# Patient Record
Sex: FEMALE | Employment: PART TIME | URBAN - METROPOLITAN AREA
[De-identification: names, ages, dates, MRNs, and addresses within clinical notes are randomized per-mention and may not be internally consistent; named-entity substitution may affect disease eponyms.]

---

## 2017-02-15 ENCOUNTER — OFFICE VISIT (OUTPATIENT)
Dept: PSYCHIATRY | Facility: CLINIC | Age: 52
End: 2017-02-15
Payer: COMMERCIAL

## 2017-02-15 VITALS
HEART RATE: 73 BPM | DIASTOLIC BLOOD PRESSURE: 72 MMHG | SYSTOLIC BLOOD PRESSURE: 121 MMHG | BODY MASS INDEX: 30.05 KG/M2 | WEIGHT: 176 LBS | HEIGHT: 64 IN

## 2017-02-15 DIAGNOSIS — F41.9 ANXIETY DISORDER, UNSPECIFIED TYPE: ICD-10-CM

## 2017-02-15 DIAGNOSIS — F33.0 MDD (MAJOR DEPRESSIVE DISORDER), RECURRENT EPISODE, MILD: Primary | ICD-10-CM

## 2017-02-15 DIAGNOSIS — F98.8 ATTENTION DEFICIT DISORDER: ICD-10-CM

## 2017-02-15 DIAGNOSIS — F90.2 ATTENTION DEFICIT HYPERACTIVITY DISORDER (ADHD), COMBINED TYPE, MILD: ICD-10-CM

## 2017-02-15 DIAGNOSIS — F32.A DEPRESSION, UNSPECIFIED DEPRESSION TYPE: ICD-10-CM

## 2017-02-15 PROCEDURE — 99999 PR PBB SHADOW E&M-EST. PATIENT-LVL III: CPT | Mod: PBBFAC,,, | Performed by: PSYCHIATRY & NEUROLOGY

## 2017-02-15 PROCEDURE — 99214 OFFICE O/P EST MOD 30 MIN: CPT | Mod: S$GLB,,, | Performed by: PSYCHIATRY & NEUROLOGY

## 2017-02-15 RX ORDER — DEXTROAMPHETAMINE SACCHARATE, AMPHETAMINE ASPARTATE MONOHYDRATE, DEXTROAMPHETAMINE SULFATE AND AMPHETAMINE SULFATE 7.5; 7.5; 7.5; 7.5 MG/1; MG/1; MG/1; MG/1
60 CAPSULE, EXTENDED RELEASE ORAL EVERY MORNING
Qty: 60 CAPSULE | Refills: 0 | Status: SHIPPED | OUTPATIENT
Start: 2017-02-20 | End: 2017-05-15

## 2017-02-15 RX ORDER — DEXTROAMPHETAMINE SACCHARATE, AMPHETAMINE ASPARTATE MONOHYDRATE, DEXTROAMPHETAMINE SULFATE AND AMPHETAMINE SULFATE 7.5; 7.5; 7.5; 7.5 MG/1; MG/1; MG/1; MG/1
60 CAPSULE, EXTENDED RELEASE ORAL EVERY MORNING
Qty: 60 CAPSULE | Refills: 0 | Status: SHIPPED | OUTPATIENT
Start: 2017-04-20 | End: 2017-05-15 | Stop reason: SDUPTHER

## 2017-02-15 RX ORDER — DEXTROAMPHETAMINE SACCHARATE, AMPHETAMINE ASPARTATE MONOHYDRATE, DEXTROAMPHETAMINE SULFATE AND AMPHETAMINE SULFATE 7.5; 7.5; 7.5; 7.5 MG/1; MG/1; MG/1; MG/1
60 CAPSULE, EXTENDED RELEASE ORAL EVERY MORNING
Qty: 60 CAPSULE | Refills: 0 | Status: SHIPPED | OUTPATIENT
Start: 2017-03-21 | End: 2017-05-15

## 2017-02-15 NOTE — PROGRESS NOTES
Outpatient Psychiatry Follow-Up Visit (MD/NP)   2/15/2017    Clinical Status of Patient: Outpatient (Ambulatory)     Session Length:  30 minutes (E&M)      Chief Complaint: Leah Sung is a 51 y.o. female who presents today for follow-up of depression, anxiety, inattention and interpersonal relational problems.  Met with patient.     Interval History and Content of Current Session:   General impression: First appointment since 11/23/2016. She notes interim events -- see below.     Target symptoms: Inattentional/concentrational difficulties, h/o dysthymic mood and irritibility, insomnia, crying spells, excessive worry, decreased energy and motivation, low self-esteem, feelings of helplessness; denies hopelessness or S.I. (only remote as a teen).     Interim events:  Discussed ongoing events -- self disclosure noted.    Generally doing OK overall.  Stays very busy.    She thinks her meds are helping her to manage her ADHD, depression and anxiety Sx well.      Pt states she has modified her diet; she is working on other lifestyle modifications.    She is enrolled in an on line Masters program -- the school is based in [place].  She states she is taking an equivalent of 12 hours currently (graduate level courses).  The Masters program is for [employer] with [employer] -- she states she will have a degree in this discipline.  She must watch a lot of on line videos every week.  She also must travel there and take a class on site at least once a year.  She states this program is accredited.      We also discussed about her daughter (18 yo), who is a senior in high school.  She is trying to choose a college.  She is strongly considering [employer], but has many other options.    Pt has limited most interactions with her ex- to just texting or occasional calling, always in direct correlation with their daughter.  She avoids all contact with his biological family, if possible.    Her daughter still sees her dad  and his family often.     She is still working at [employer] -- still dealing with her narcissistic boss who is a poor communicator.  She states her boss' supervisor is easier to work with overall, which takes some of the stress off of her.  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    She has a much smaller area to cover ([facility] and some areas on [place]).  She still does a fair amount of driving.      She continues to use the Adderall XR 30 mg in AM.  Without the medication, she is more hyperverbal, more spontaneous, speaks more rapidly, tends to change subjects more.  She states she is more calm when she takes 2, tends to be much more quiet and reserved.  She still gets distracted easily if she does not take both tabs daily.  She will tend to cut back some on days when she is not working.      PCP:  None currently.      Labs: Not done since 9/13/12: all results for TSH, CBC and CMP were unremarkable.     Review of Systems   · PSYCHIATRIC: Pertinant items are noted in the narrative.  · CONSTITUTIONAL: + recent wt gain     · MUSCULOSKELETAL: No pain or stiffness of the joints.  · NEUROLOGIC: No weakness, sensory changes, seizures, confusion, memory loss, tremor or other abnormal movements.  · ENDOCRINE: No polydipsia or polyuria.  · INTEGUMENTARY: No rashes or lacerations.  · EYES: No exophthalmos, jaundice or blindness.  · ENT: No dizziness, tinnitus or hearing loss.  · RESPIRATORY: No shortness of breath.  · CARDIOVASCULAR: No tachycardia or chest pain.  · GASTROINTESTINAL: No nausea, vomiting, pain, constipation or diarrhea.  · GENITOURINARY: No frequency, dysuria or sexual dysfunction.      Current Psychotropic Medications:   Adderall XR 30 mg   Two tablets oral in AM daily (GENERALLY TAKES  "BID DAILY)    Adderall 20 mg  1 tablet in PM prn in attention (TAKES RARELY)   Wellbutrin  mg   1 tablet oral daily (TAKES DAILY)     Compliance: Yes     Side effects: None     Risk Parameters:   Patient reports no suicidal ideation   Patient reports no homicidal ideation   Patient reports no self-injurious behavior   Patient reports no violent behavior     Patient's Response to Intervention:   The patient's response to intervention is motivated.     Progress Toward Goals and Other Mental Status Changes:   The patient's progress toward goals is fair.     Vitals: Most recent vital signs were noted:      Vitals - 1 value per visit 6/1/2015 4/26/2016 11/23/2016 2/15/2017   SYSTOLIC 172 146 149 121   DIASTOLIC 99 76 91 72   PULSE 83 89 79 73   Weight (lb) 171.2 167.4 173 176   HEIGHT 5' 4" 5' 4"  5' 4"   BODY MASS INDEX 29.37 28.73 29.7 30.21   VISIT REPORT           Mental Status Evaluation      Appearance:  unremarkable, age appropriate, casually dressed, neatly groomed, smiles on greeting    Behavior:  cooperative, eye contact normal, pleasant, engaging    Speech:  normal tone, normal pitch, normal volume, minimally pressured    Mood:  "OK"   Affect:  Euthymic to mildly anxious, congruent and appropriate    Thought Process:  Somewhat circumstantial, logical    Thought Content:  no suicidality, no homicidality, delusions, or paranoia    Sensorium:  grossly intact    Attention Span & Concentration  intact    Cognition:  fund of knowledge intact and appropriate to age and level of education    Insight:  good    Judgment:  good      Impression:   Attention-Deficit Hyperactivity Disorder, Combined Type     Major Depressive Disorder, Recurrent, Mild  Anxiety Disorder, unspecified    Plan:   Medication Management -- Continue current medications. Three Rx for Adderall XR, each for a 30 day supply with no refills & with "Do not fill until" dates posted accordingly, were given to patient.      Additional Notes -- " N/A    Return to Clinic: 3 months, or sooner prn.

## 2017-05-15 ENCOUNTER — OFFICE VISIT (OUTPATIENT)
Dept: PSYCHIATRY | Facility: CLINIC | Age: 52
End: 2017-05-15
Payer: COMMERCIAL

## 2017-05-15 VITALS
DIASTOLIC BLOOD PRESSURE: 79 MMHG | HEIGHT: 64 IN | HEART RATE: 96 BPM | SYSTOLIC BLOOD PRESSURE: 133 MMHG | WEIGHT: 179 LBS | BODY MASS INDEX: 30.56 KG/M2

## 2017-05-15 DIAGNOSIS — F41.9 ANXIETY DISORDER, UNSPECIFIED TYPE: ICD-10-CM

## 2017-05-15 DIAGNOSIS — F98.8 ATTENTION DEFICIT DISORDER: ICD-10-CM

## 2017-05-15 DIAGNOSIS — F33.0 MDD (MAJOR DEPRESSIVE DISORDER), RECURRENT EPISODE, MILD: ICD-10-CM

## 2017-05-15 DIAGNOSIS — F90.2 ATTENTION DEFICIT HYPERACTIVITY DISORDER (ADHD), COMBINED TYPE, MILD: Primary | ICD-10-CM

## 2017-05-15 PROCEDURE — 99999 PR PBB SHADOW E&M-EST. PATIENT-LVL III: CPT | Mod: PBBFAC,,, | Performed by: PSYCHIATRY & NEUROLOGY

## 2017-05-15 PROCEDURE — 99214 OFFICE O/P EST MOD 30 MIN: CPT | Mod: S$GLB,,, | Performed by: PSYCHIATRY & NEUROLOGY

## 2017-05-15 PROCEDURE — 1160F RVW MEDS BY RX/DR IN RCRD: CPT | Mod: S$GLB,,, | Performed by: PSYCHIATRY & NEUROLOGY

## 2017-05-15 RX ORDER — DEXTROAMPHETAMINE SACCHARATE, AMPHETAMINE ASPARTATE MONOHYDRATE, DEXTROAMPHETAMINE SULFATE AND AMPHETAMINE SULFATE 7.5; 7.5; 7.5; 7.5 MG/1; MG/1; MG/1; MG/1
60 CAPSULE, EXTENDED RELEASE ORAL EVERY MORNING
Qty: 60 CAPSULE | Refills: 0 | Status: SHIPPED | OUTPATIENT
Start: 2017-07-19 | End: 2017-08-14 | Stop reason: SDUPTHER

## 2017-05-15 RX ORDER — DEXTROAMPHETAMINE SACCHARATE, AMPHETAMINE ASPARTATE MONOHYDRATE, DEXTROAMPHETAMINE SULFATE AND AMPHETAMINE SULFATE 7.5; 7.5; 7.5; 7.5 MG/1; MG/1; MG/1; MG/1
60 CAPSULE, EXTENDED RELEASE ORAL EVERY MORNING
Qty: 60 CAPSULE | Refills: 0 | Status: SHIPPED | OUTPATIENT
Start: 2017-06-19 | End: 2017-08-14

## 2017-05-15 RX ORDER — DEXTROAMPHETAMINE SACCHARATE, AMPHETAMINE ASPARTATE MONOHYDRATE, DEXTROAMPHETAMINE SULFATE AND AMPHETAMINE SULFATE 7.5; 7.5; 7.5; 7.5 MG/1; MG/1; MG/1; MG/1
60 CAPSULE, EXTENDED RELEASE ORAL EVERY MORNING
Qty: 60 CAPSULE | Refills: 0 | Status: SHIPPED | OUTPATIENT
Start: 2017-05-20 | End: 2017-08-14

## 2017-05-15 RX ORDER — BUPROPION HYDROCHLORIDE 300 MG/1
300 TABLET ORAL DAILY
Qty: 30 TABLET | Refills: 12 | Status: SHIPPED | OUTPATIENT
Start: 2017-05-15 | End: 2018-02-07 | Stop reason: SDUPTHER

## 2017-05-15 NOTE — PROGRESS NOTES
Outpatient Psychiatry Follow-Up Visit (MD/NP)   5/15/2017    Clinical Status of Patient: Outpatient (Ambulatory)     Session Length:  30 minutes (E&M)      Chief Complaint: Leah Sung is a 52 y.o. female who presents today for follow-up of depression, anxiety, inattention and interpersonal relational problems.  Met with patient.     Interval History and Content of Current Session:   General impression: First appointment since 2/15/2017. She notes interim events -- see below.     Target symptoms: Inattentional/concentrational difficulties, h/o dysthymic mood and irritibility, insomnia, crying spells, excessive worry, decreased energy and motivation, low self-esteem, feelings of helplessness; denies hopelessness or S.I. (only remote as a teen).     Interim events:  Discussed ongoing events -- self disclosure noted.    She had a quiet Mother's Day -- she enjoyed this.      Daughter (Ayana) has been doing well.  She will be graduating on Saturday 5/20/17 from [employer].    She will be attending [employer] in [place] in the fall (pt is happy that she will be living with her and commuting to the campus).         Generally doing OK overall.  Stays very busy.  She is working on her Masters program -- the school is based in [place].  She states this program is accredited.  She states she is taking an equivalent of 12 hours currently (graduate level courses).  The Masters program is for [employer] with [employer] -- she states she will have a degree in this discipline.  She must watch a lot of on line videos every week.  She also must travel there and take a class on site at least once a year -- she is planning to do this later next week about a week after her daughter's graduation.  She states because she will be out of state, she will need an early fill on the Adderall XR (I told pt I would authorize early fill if needed).    She thinks her meds are helping her to manage her ADHD,  depression and anxiety Sx well.      Pt states she has modified her diet; she is working on other lifestyle modifications.      We also discussed about her daughter (18 yo), who is a senior in high school.  She is trying to choose a college.  She is strongly considering [employer], but has many other options.    Pt has limited most interactions with her ex- to just texting or occasional calling, always in direct correlation with their daughter.  She avoids all contact with his biological family, if possible.    Her daughter still sees her dad (pt's ex-) and his family often.     She is still working at [employer] -- still dealing with her narcissistic boss who is a poor communicator.  She states her boss' supervisor is easier to work with overall, which takes some of the stress off of her.  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    She has a much smaller area to cover ([facility] and some areas on [place]).  She still does a fair amount of driving.      She continues to use the Adderall XR 30 mg in AM.  Without the medication, she is more hyperverbal, more spontaneous, speaks more rapidly, tends to change subjects more.  She states she is more calm when she takes 2, tends to be much more quiet and reserved.  She still gets distracted easily if she does not take both tabs daily.  She will tend to cut back some on days when she is not working.      PCP:  None currently.      Labs: Not done since 9/13/12: all results for TSH, CBC and CMP were unremarkable.     Review of Systems   · PSYCHIATRIC: Pertinant items are noted in the narrative.  · CONSTITUTIONAL: + recent wt gain     · MUSCULOSKELETAL: No pain or stiffness of the joints.  · NEUROLOGIC: No weakness, sensory changes, seizures,  "confusion, memory loss, tremor or other abnormal movements.  · ENDOCRINE: No polydipsia or polyuria.  · INTEGUMENTARY: No rashes or lacerations.  · EYES: No exophthalmos, jaundice or blindness.  · ENT: No dizziness, tinnitus or hearing loss.  · RESPIRATORY: No shortness of breath.  · CARDIOVASCULAR: No tachycardia or chest pain.  · GASTROINTESTINAL: No nausea, vomiting, pain, constipation or diarrhea.  · GENITOURINARY: No frequency, dysuria or sexual dysfunction.      Current Psychotropic Medications:   Adderall XR 30 mg   Two tablets oral in AM daily (GENERALLY TAKES BID DAILY)    Adderall 20 mg  1 tablet in PM prn in attention (TAKES RARELY)   Wellbutrin  mg   1 tablet oral daily (TAKES DAILY)     Compliance: Yes     Side effects: None     Risk Parameters:   Patient reports no suicidal ideation   Patient reports no homicidal ideation   Patient reports no self-injurious behavior   Patient reports no violent behavior     Patient's Response to Intervention:   The patient's response to intervention is motivated.     Progress Toward Goals and Other Mental Status Changes:   The patient's progress toward goals is fair.     Vitals: Most recent vital signs were noted:      Vitals - 1 value per visit 4/26/2016 11/23/2016 2/15/2017 5/15/2017   SYSTOLIC 146 149 121 133   DIASTOLIC 76 91 72 79   PULSE 89 79 73 96   Weight (lb) 167.4 173 176 179   Weight (kg) 75.932 78.472 79.833 81.194   HEIGHT 5' 4"  5' 4" 5' 4"   BODY MASS INDEX 28.73 29.7 30.21 30.73   VISIT REPORT           Mental Status Evaluation      Appearance:  unremarkable, age appropriate, casually dressed, neatly groomed, smiles on greeting    Behavior:  cooperative, eye contact normal, pleasant, engaging    Speech:  normal tone, normal pitch, normal volume, minimally pressured    Mood:  "OK"   Affect:  Euthymic to mildly anxious, congruent and appropriate    Thought Process:  Somewhat circumstantial, logical    Thought Content:  no suicidality, no " "homicidality, delusions, or paranoia    Sensorium:  grossly intact    Attention Span & Concentration  intact    Cognition:  fund of knowledge intact and appropriate to age and level of education    Insight:  good    Judgment:  good      Impression:   Attention-Deficit Hyperactivity Disorder, Combined Type     Major Depressive Disorder, Recurrent, Mild  Anxiety Disorder, unspecified    Plan:   Medication Management -- Continue current medications. Three Rx for Adderall XR, each for a 30 day supply with no refills & with "Do not fill until" dates posted accordingly, were given to patient.      Additional Notes -- N/A    Return to Clinic: 3 months, or sooner prn.      "

## 2017-08-14 ENCOUNTER — OFFICE VISIT (OUTPATIENT)
Dept: PSYCHIATRY | Facility: CLINIC | Age: 52
End: 2017-08-14
Payer: COMMERCIAL

## 2017-08-14 VITALS
BODY MASS INDEX: 30.76 KG/M2 | SYSTOLIC BLOOD PRESSURE: 130 MMHG | HEIGHT: 64 IN | WEIGHT: 180.19 LBS | HEART RATE: 61 BPM | DIASTOLIC BLOOD PRESSURE: 83 MMHG

## 2017-08-14 DIAGNOSIS — F41.9 ANXIETY DISORDER, UNSPECIFIED TYPE: ICD-10-CM

## 2017-08-14 DIAGNOSIS — F90.0 ATTENTION DEFICIT HYPERACTIVITY DISORDER (ADHD), PREDOMINANTLY INATTENTIVE TYPE: Primary | ICD-10-CM

## 2017-08-14 DIAGNOSIS — F90.2 ATTENTION DEFICIT HYPERACTIVITY DISORDER (ADHD), COMBINED TYPE, MILD: ICD-10-CM

## 2017-08-14 DIAGNOSIS — F33.0 MDD (MAJOR DEPRESSIVE DISORDER), RECURRENT EPISODE, MILD: ICD-10-CM

## 2017-08-14 PROCEDURE — 99214 OFFICE O/P EST MOD 30 MIN: CPT | Mod: S$GLB,,, | Performed by: PSYCHIATRY & NEUROLOGY

## 2017-08-14 PROCEDURE — 99999 PR PBB SHADOW E&M-EST. PATIENT-LVL III: CPT | Mod: PBBFAC,,, | Performed by: PSYCHIATRY & NEUROLOGY

## 2017-08-14 PROCEDURE — 3008F BODY MASS INDEX DOCD: CPT | Mod: S$GLB,,, | Performed by: PSYCHIATRY & NEUROLOGY

## 2017-08-14 RX ORDER — DEXTROAMPHETAMINE SACCHARATE, AMPHETAMINE ASPARTATE MONOHYDRATE, DEXTROAMPHETAMINE SULFATE AND AMPHETAMINE SULFATE 7.5; 7.5; 7.5; 7.5 MG/1; MG/1; MG/1; MG/1
60 CAPSULE, EXTENDED RELEASE ORAL EVERY MORNING
Qty: 60 CAPSULE | Refills: 0 | Status: SHIPPED | OUTPATIENT
Start: 2017-09-17 | End: 2017-11-15

## 2017-08-14 RX ORDER — DEXTROAMPHETAMINE SACCHARATE, AMPHETAMINE ASPARTATE MONOHYDRATE, DEXTROAMPHETAMINE SULFATE AND AMPHETAMINE SULFATE 7.5; 7.5; 7.5; 7.5 MG/1; MG/1; MG/1; MG/1
60 CAPSULE, EXTENDED RELEASE ORAL EVERY MORNING
Qty: 60 CAPSULE | Refills: 0 | Status: SHIPPED | OUTPATIENT
Start: 2017-08-18 | End: 2017-11-15

## 2017-08-14 RX ORDER — DEXTROAMPHETAMINE SACCHARATE, AMPHETAMINE ASPARTATE MONOHYDRATE, DEXTROAMPHETAMINE SULFATE AND AMPHETAMINE SULFATE 7.5; 7.5; 7.5; 7.5 MG/1; MG/1; MG/1; MG/1
60 CAPSULE, EXTENDED RELEASE ORAL EVERY MORNING
Qty: 60 CAPSULE | Refills: 0 | Status: SHIPPED | OUTPATIENT
Start: 2017-10-17 | End: 2017-11-15 | Stop reason: SDUPTHER

## 2017-08-14 NOTE — PROGRESS NOTES
Outpatient Psychiatry Follow-Up Visit (MD/NP)   8/14/2017    Clinical Status of Patient: Outpatient (Ambulatory)     Session Length:  30 minutes (E&M)      Chief Complaint: Leah Sung is a 52 y.o. female who presents today for follow-up of depression, anxiety, inattention and interpersonal relational problems.  Met with patient.     Interval History and Content of Current Session:   General impression: First appointment since 5/15/2017. She notes interim events -- see below.     Target symptoms: Inattentional/concentrational difficulties, h/o dysthymic mood and irritibility, insomnia, crying spells, excessive worry, decreased energy and motivation, low self-esteem, feelings of helplessness; denies hopelessness or S.I. (only remote as a teen).     Interim events:  Discussed ongoing events -- self disclosure noted.      Her daughter graduated from high school and is now attending [employer] in [place]     She is still working towards getting her Master's -- 2 more years to go.  She travelled to [place] (her daughter went with her) for the class she needed to attend on the college campus. She is working on her Masters program -- the school is based in [place].  She states this program is accredited.  She states she is taking an equivalent of 12 hours currently (graduate level courses).  The Masters program is for [employer] with [employer] -- she states she will have a degree in this discipline.  She must watch a lot of on line videos every week.      She and her ex- also are planning to sell the house.  She wants to take her portion of the $ and put it into a new, smaller house.    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      She thinks her meds are helping her to manage her ADHD, depression and anxiety Sx well.    She continues to take the Adderall XR 30 mg in AM.  Without the medication, she is more hyperverbal, more spontaneous, speaks more rapidly,  tends to change subjects more.  She states she is more calm when she takes 2, tends to be much more quiet and reserved.  She still gets distracted easily if she does not take both tabs daily.  She will tend to cut back some on days when she is not working.      Pt states she has modified her diet; she is working on other lifestyle modifications.      She is still working at [employer] -- still dealing with her narcissistic boss who is a poor communicator.  She states her boss' supervisor is easier to work with overall, which takes some of the stress off of her.  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    She has a much smaller area to cover ([facility] and some areas on [place]).  She still does a fair amount of driving.      PCP:  None currently.      Labs: Not done since 9/13/12: all results for TSH, CBC and CMP were unremarkable.     Review of Systems   · PSYCHIATRIC: Pertinant items are noted in the narrative.  · CONSTITUTIONAL: + recent wt gain     · MUSCULOSKELETAL: No pain or stiffness of the joints.  · NEUROLOGIC: No weakness, sensory changes, seizures, confusion, memory loss, tremor or other abnormal movements.  · ENDOCRINE: No polydipsia or polyuria.  · INTEGUMENTARY: No rashes or lacerations.  · EYES: No exophthalmos, jaundice or blindness.  · ENT: No dizziness, tinnitus or hearing loss.  · RESPIRATORY: No shortness of breath.  · CARDIOVASCULAR: No tachycardia or chest pain.  · GASTROINTESTINAL: No nausea, vomiting, pain, constipation or diarrhea.  · GENITOURINARY: No frequency, dysuria or sexual dysfunction.      Current Psychotropic Medications:   Adderall XR 30 mg   Two tablets oral in AM daily (GENERALLY TAKES BID DAILY)    Adderall 20 mg  1 tablet in PM prn in attention (TAKES  "RARELY)   Wellbutrin  mg   1 tablet oral daily (TAKES DAILY)     Compliance: Yes     Side effects: None     Risk Parameters:   Patient reports no suicidal ideation   Patient reports no homicidal ideation   Patient reports no self-injurious behavior   Patient reports no violent behavior     Patient's Response to Intervention:   The patient's response to intervention is motivated.     Progress Toward Goals and Other Mental Status Changes:   The patient's progress toward goals is fair.     Vitals: Most recent vital signs were noted:      Vitals - 1 value per visit 11/23/2016 2/15/2017 5/15/2017 8/14/2017   SYSTOLIC 149 121 133 130   DIASTOLIC 91 72 79 83   PULSE 79 73 96 61   Weight (lb) 173 176 179 180.2   Weight (kg) 78.472 79.833 81.194 81.738   HEIGHT  5' 4" 5' 4" 5' 4"   BODY MASS INDEX 29.7 30.21 30.73 30.93   VISIT REPORT           Mental Status Evaluation      Appearance:  unremarkable, age appropriate, casually dressed, neatly groomed, smiles on greeting    Behavior:  cooperative, eye contact normal, pleasant, engaging    Speech:  normal tone, normal pitch, normal volume, minimally pressured    Mood:  "OK"   Affect:  Euthymic to mildly anxious, congruent and appropriate    Thought Process:  Mildly circumstantial, logical    Thought Content:  no suicidality, no homicidality, delusions, or paranoia    Sensorium:  grossly intact    Attention Span & Concentration  intact    Cognition:  fund of knowledge intact and appropriate to age and level of education    Insight:  good    Judgment:  good      Impression:   Attention-Deficit Hyperactivity Disorder, mainly inattentive type     Major Depressive Disorder, Recurrent, Mild  Anxiety Disorder, unspecified    Plan:   Medication Management -- Continue current medications. Three Rx for Adderall XR, each for a 30 day supply with no refills & with "Do not fill until" dates posted accordingly, were given to patient.      Additional Notes -- N/A    Return to Clinic: 3 " months, or sooner prn.

## 2017-11-15 ENCOUNTER — OFFICE VISIT (OUTPATIENT)
Dept: PSYCHIATRY | Facility: CLINIC | Age: 52
End: 2017-11-15
Payer: COMMERCIAL

## 2017-11-15 VITALS
DIASTOLIC BLOOD PRESSURE: 90 MMHG | HEART RATE: 85 BPM | SYSTOLIC BLOOD PRESSURE: 153 MMHG | HEIGHT: 64 IN | BODY MASS INDEX: 29.43 KG/M2 | WEIGHT: 172.38 LBS

## 2017-11-15 DIAGNOSIS — F90.2 ATTENTION DEFICIT HYPERACTIVITY DISORDER (ADHD), COMBINED TYPE, MILD: ICD-10-CM

## 2017-11-15 DIAGNOSIS — F41.9 ANXIETY DISORDER, UNSPECIFIED TYPE: ICD-10-CM

## 2017-11-15 DIAGNOSIS — F90.0 ATTENTION DEFICIT HYPERACTIVITY DISORDER (ADHD), PREDOMINANTLY INATTENTIVE TYPE: Primary | ICD-10-CM

## 2017-11-15 DIAGNOSIS — F33.0 MDD (MAJOR DEPRESSIVE DISORDER), RECURRENT EPISODE, MILD: ICD-10-CM

## 2017-11-15 PROCEDURE — 99214 OFFICE O/P EST MOD 30 MIN: CPT | Mod: S$GLB,,, | Performed by: PSYCHIATRY & NEUROLOGY

## 2017-11-15 PROCEDURE — 99999 PR PBB SHADOW E&M-EST. PATIENT-LVL III: CPT | Mod: PBBFAC,,, | Performed by: PSYCHIATRY & NEUROLOGY

## 2017-11-15 RX ORDER — DEXTROAMPHETAMINE SACCHARATE, AMPHETAMINE ASPARTATE MONOHYDRATE, DEXTROAMPHETAMINE SULFATE AND AMPHETAMINE SULFATE 7.5; 7.5; 7.5; 7.5 MG/1; MG/1; MG/1; MG/1
60 CAPSULE, EXTENDED RELEASE ORAL EVERY MORNING
Qty: 60 CAPSULE | Refills: 0 | Status: SHIPPED | OUTPATIENT
Start: 2018-01-13 | End: 2018-02-07 | Stop reason: SDUPTHER

## 2017-11-15 RX ORDER — DEXTROAMPHETAMINE SACCHARATE, AMPHETAMINE ASPARTATE MONOHYDRATE, DEXTROAMPHETAMINE SULFATE AND AMPHETAMINE SULFATE 7.5; 7.5; 7.5; 7.5 MG/1; MG/1; MG/1; MG/1
60 CAPSULE, EXTENDED RELEASE ORAL EVERY MORNING
Qty: 60 CAPSULE | Refills: 0 | Status: SHIPPED | OUTPATIENT
Start: 2017-12-14 | End: 2018-02-07 | Stop reason: SDUPTHER

## 2017-11-15 RX ORDER — DEXTROAMPHETAMINE SACCHARATE, AMPHETAMINE ASPARTATE MONOHYDRATE, DEXTROAMPHETAMINE SULFATE AND AMPHETAMINE SULFATE 7.5; 7.5; 7.5; 7.5 MG/1; MG/1; MG/1; MG/1
60 CAPSULE, EXTENDED RELEASE ORAL EVERY MORNING
Qty: 60 CAPSULE | Refills: 0 | Status: SHIPPED | OUTPATIENT
Start: 2017-11-15 | End: 2018-02-07 | Stop reason: SDUPTHER

## 2017-11-15 NOTE — PROGRESS NOTES
Outpatient Psychiatry Follow-Up Visit (MD/NP)   11/15/2017    Clinical Status of Patient: Outpatient (Ambulatory)     Session Length:  30 minutes (E&M)      Chief Complaint: Leah Sung is a 52 y.o. female who presents today for follow-up of depression, anxiety, inattention and interpersonal relational problems.  Met with patient.     Interval History and Content of Current Session:   General impression: First appointment since 8/14/2017. She notes interim events -- see below.     Target symptoms: Inattentional/concentrational difficulties, h/o dysthymic mood and irritibility, insomnia, crying spells, excessive worry, decreased energy and motivation, low self-esteem, feelings of helplessness; denies hopelessness or S.I. (only remote as a teen).     Interim events:  Discussed ongoing events -- self disclosure noted.      Her daughter graduated from high school in May 2017 and is now attending [email or web address] in [place]  Her daughter is living with her at home.  Pt notes she is trying to help her be more independent, be less reliant on her.     She is still working towards getting her Master's -- 2 more years to go.  The school is based in [place].  She states this program is accredited.  She states she is taking an equivalent of 12 hours currently (graduate level courses).  The Masters program is for [employer] with [employer] -- she states she will have a degree in this discipline.  She must watch a lot of on line videos every week.        She and her ex- are in the process of selling the house.  She wants to take her portion of the $ and put it into a new, smaller house.    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    Pt is also considering moving to [place], [place], after they sell the house and after she completes her courses and obtains certification.      She thinks her meds are helping her to manage her ADHD, depression and anxiety Sx well.    She  continues to take the Adderall XR 30 mg in AM.  Without the medication, she is more hyperverbal, more spontaneous, speaks more rapidly, tends to change subjects more.  She states she is more calm when she takes 2, tends to be much more quiet and reserved.  She still gets distracted easily if she does not take both tabs daily.  She will tend to cut back some on days when she is not working.      Pt states she has modified her diet; she is working on other lifestyle modifications.      She is still working at [employer] -- she has a new supervisor who is a better communicator overall.  She has had more responsibilities here -- self disclosure noted. 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    She has a much smaller area to cover ([facility] and some areas on [place]).  She still does a fair amount of driving.      PCP:  None currently.      Labs: Not done since 9/13/12: all results for TSH, CBC and CMP were unremarkable.     Review of Systems   · PSYCHIATRIC: Pertinant items are noted in the narrative.  · CONSTITUTIONAL: + recent wt loss     · MUSCULOSKELETAL: No pain or stiffness of the joints.  · NEUROLOGIC: No weakness, sensory changes, seizures, confusion, memory loss, tremor or other abnormal movements.  · ENDOCRINE: No polydipsia or polyuria.  · INTEGUMENTARY: No rashes or lacerations.  · EYES: No exophthalmos, jaundice or blindness.  · ENT: No dizziness, tinnitus or hearing loss.  · RESPIRATORY: No shortness of breath.  · CARDIOVASCULAR: No tachycardia or chest pain.  · GASTROINTESTINAL: No nausea, vomiting, pain, constipation or diarrhea.  · GENITOURINARY: No frequency, dysuria or sexual dysfunction.      Current Psychotropic Medications:   Adderall XR 30 mg   Two tablets oral in AM daily  "(GENERALLY TAKES BID DAILY)    Adderall 20 mg  1 tablet in PM prn in attention (TAKES RARELY)   Wellbutrin  mg   1 tablet oral daily (TAKES DAILY)     Compliance: Yes     Side effects: None     Risk Parameters:   Patient reports no suicidal ideation   Patient reports no homicidal ideation   Patient reports no self-injurious behavior   Patient reports no violent behavior     Patient's Response to Intervention:   The patient's response to intervention is motivated.     Progress Toward Goals and Other Mental Status Changes:   The patient's progress toward goals is fair.     Vitals: Most recent vital signs were noted:      Vitals - 1 value per visit 2/15/2017 5/15/2017 8/14/2017 11/15/2017   SYSTOLIC 121 133 130 153   DIASTOLIC 72 79 83 90   PULSE 73 96 61 85   Weight (lb) 176 179 180.2 172.4   Weight (kg) 79.833 81.194 81.738 78.2   HEIGHT 5' 4" 5' 4" 5' 4" 5' 4"   BODY MASS INDEX 30.21 30.73 30.93 29.59   VISIT REPORT           Mental Status Evaluation      Appearance:  unremarkable, age appropriate, casually dressed, neatly groomed, smiles on greeting    Behavior:  cooperative, eye contact normal, pleasant, engaging    Speech:  normal tone, normal pitch, normal volume, minimally pressured    Mood:  "OK"   Affect:  Euthymic, congruent and appropriate    Thought Process:  Mildly circumstantial, logical    Thought Content:  no suicidality, no homicidality, delusions, or paranoia    Sensorium:  grossly intact    Attention Span & Concentration  intact    Cognition:  fund of knowledge intact and appropriate to age and level of education    Insight:  good    Judgment:  good      Impression:   Attention-Deficit Hyperactivity Disorder, mainly inattentive type     Major Depressive Disorder, Recurrent, Mild  Anxiety Disorder, unspecified    Plan:   Medication Management -- Continue current medications. Three Rx for Adderall XR, each for a 30 day supply with no refills & with "Do not fill until" dates posted accordingly, " were given to patient.      Additional Notes -- N/A    Return to Clinic: 3 months, or sooner prn.

## 2018-02-07 ENCOUNTER — OFFICE VISIT (OUTPATIENT)
Dept: PSYCHIATRY | Facility: CLINIC | Age: 53
End: 2018-02-07
Payer: COMMERCIAL

## 2018-02-07 VITALS
DIASTOLIC BLOOD PRESSURE: 86 MMHG | HEART RATE: 96 BPM | BODY MASS INDEX: 29.09 KG/M2 | WEIGHT: 170.38 LBS | HEIGHT: 64 IN | SYSTOLIC BLOOD PRESSURE: 159 MMHG

## 2018-02-07 DIAGNOSIS — F90.2 ATTENTION DEFICIT HYPERACTIVITY DISORDER (ADHD), COMBINED TYPE, MILD: ICD-10-CM

## 2018-02-07 DIAGNOSIS — F41.9 ANXIETY DISORDER, UNSPECIFIED TYPE: ICD-10-CM

## 2018-02-07 DIAGNOSIS — F32.A DEPRESSION, UNSPECIFIED DEPRESSION TYPE: ICD-10-CM

## 2018-02-07 DIAGNOSIS — F90.0 ATTENTION DEFICIT HYPERACTIVITY DISORDER (ADHD), PREDOMINANTLY INATTENTIVE TYPE: Primary | ICD-10-CM

## 2018-02-07 DIAGNOSIS — F33.0 MDD (MAJOR DEPRESSIVE DISORDER), RECURRENT EPISODE, MILD: ICD-10-CM

## 2018-02-07 PROCEDURE — 3008F BODY MASS INDEX DOCD: CPT | Mod: S$GLB,,, | Performed by: PSYCHIATRY & NEUROLOGY

## 2018-02-07 PROCEDURE — 99214 OFFICE O/P EST MOD 30 MIN: CPT | Mod: S$GLB,,, | Performed by: PSYCHIATRY & NEUROLOGY

## 2018-02-07 PROCEDURE — 99999 PR PBB SHADOW E&M-EST. PATIENT-LVL III: CPT | Mod: PBBFAC,,, | Performed by: PSYCHIATRY & NEUROLOGY

## 2018-02-07 RX ORDER — DEXTROAMPHETAMINE SACCHARATE, AMPHETAMINE ASPARTATE MONOHYDRATE, DEXTROAMPHETAMINE SULFATE AND AMPHETAMINE SULFATE 7.5; 7.5; 7.5; 7.5 MG/1; MG/1; MG/1; MG/1
60 CAPSULE, EXTENDED RELEASE ORAL EVERY MORNING
Qty: 60 CAPSULE | Refills: 0 | Status: SHIPPED | OUTPATIENT
Start: 2018-03-14 | End: 2018-05-16

## 2018-02-07 RX ORDER — DEXTROAMPHETAMINE SACCHARATE, AMPHETAMINE ASPARTATE MONOHYDRATE, DEXTROAMPHETAMINE SULFATE AND AMPHETAMINE SULFATE 7.5; 7.5; 7.5; 7.5 MG/1; MG/1; MG/1; MG/1
60 CAPSULE, EXTENDED RELEASE ORAL EVERY MORNING
Qty: 60 CAPSULE | Refills: 0 | Status: SHIPPED | OUTPATIENT
Start: 2018-04-13 | End: 2018-05-16 | Stop reason: SDUPTHER

## 2018-02-07 RX ORDER — BUPROPION HYDROCHLORIDE 300 MG/1
300 TABLET ORAL DAILY
Qty: 30 TABLET | Refills: 12 | Status: SHIPPED | OUTPATIENT
Start: 2018-02-07

## 2018-02-07 RX ORDER — DEXTROAMPHETAMINE SACCHARATE, AMPHETAMINE ASPARTATE MONOHYDRATE, DEXTROAMPHETAMINE SULFATE AND AMPHETAMINE SULFATE 7.5; 7.5; 7.5; 7.5 MG/1; MG/1; MG/1; MG/1
60 CAPSULE, EXTENDED RELEASE ORAL EVERY MORNING
Qty: 60 CAPSULE | Refills: 0 | Status: SHIPPED | OUTPATIENT
Start: 2018-02-12 | End: 2018-05-16

## 2018-02-07 NOTE — PROGRESS NOTES
"Outpatient Psychiatry Follow-Up Visit (MD/NP)   2/7/2018    Clinical Status of Patient: Outpatient (Ambulatory)     Session Length:  30 minutes (E&M)      Chief Complaint: Leah Sung is a 52 y.o. female who presents today for follow-up of depression, anxiety, inattention and interpersonal relational problems.  Met with patient.     Interval History and Content of Current Session:   General impression: First appointment since 11/15/2017. She notes interim events -- see below.     Target symptoms: Inattentional/concentrational difficulties, h/o dysthymic mood and irritibility, insomnia, crying spells, excessive worry, decreased energy and motivation, low self-esteem, feelings of helplessness; denies hopelessness or S.I. (only remote as a teen).     Interim events:  Discussed ongoing events -- self disclosure noted.      "I'm a little stressed, but I'm hanging in there."  She feels she is coping with stress OK, both at work and at home.  "I've been wanda burning the candle at both ends".    She realizes when she does not take her medication, she does not sleep.      Her daughter is attending [employer] (freshman).  She is at home with pt, but commutes to school daily.  She has a boyfriend who is a music major.   Pt notes she is trying to help her be more independent, be less reliant on her.    She babysits to make extra money.    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      Pt is putting her house up on the market.    Pt has been going back and forth to [place].  She is working on her Masters.  She plans to take a board exam later this year.  She is strongly considering moving to [place] once her house sells.    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  She notes the challenges inherent in the system.      Pt is still working towards getting her Master's -- 2 more years to go.  The school is based in [place].  She states this program is accredited.  She states " she is taking an equivalent of 12 hours currently (graduate level courses).  The Masters program is for [employer] with [employer] -- she states she will have a degree in this discipline.  She must watch a lot of on line videos every week.         She thinks her meds are helping her to manage her ADHD, depression and anxiety Sx well.    She continues to take the Adderall XR 30 mg in AM.  Without the medication, she is more hyperverbal, more spontaneous, speaks more rapidly, tends to change subjects more.  She states she is more calm when she takes 2 & tends to be much more quiet and reserved.  She still gets distracted easily if she does not take both tabs daily.  She will tend to cut back some on days when she is not working.      Pt states she has modified her diet; she is working on other lifestyle modifications.    We discussed mindful meditation as well (pt has been trying a form of this on her own).      She is still working at [employer] -- she has a new supervisor who is a better communicator overall.  She has had more responsibilities here -- self disclosure noted. 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    She has a much smaller area to cover ([facility] and some areas on [place]).  She still does a fair amount of driving.      PCP:  None currently.      Labs: Not done since 9/13/12: all results for TSH, CBC and CMP were unremarkable.     Review of Systems   · PSYCHIATRIC: Pertinant items are noted in the narrative.  · CONSTITUTIONAL: + recent wt loss     · MUSCULOSKELETAL: No pain or stiffness of the joints.  · NEUROLOGIC: No weakness, sensory changes, seizures, confusion, memory loss, tremor or other abnormal movements.  · ENDOCRINE: No polydipsia or  "polyuria.  · INTEGUMENTARY: No rashes or lacerations.  · EYES: No exophthalmos, jaundice or blindness.  · ENT: No dizziness, tinnitus or hearing loss.  · RESPIRATORY: No shortness of breath.  · CARDIOVASCULAR: No tachycardia or chest pain.  · GASTROINTESTINAL: No nausea, vomiting, pain, constipation or diarrhea.  · GENITOURINARY: No frequency, dysuria or sexual dysfunction.      Current Psychotropic Medications:   Adderall XR 30 mg   Two tablets oral in AM daily (GENERALLY TAKES BID DAILY)    Adderall 20 mg  1 tablet in PM prn in attention (TAKES RARELY)   Wellbutrin  mg   1 tablet oral daily (TAKES DAILY)     Compliance: Yes     Side effects: None     Risk Parameters:   Patient reports no suicidal ideation   Patient reports no homicidal ideation   Patient reports no self-injurious behavior   Patient reports no violent behavior     Patient's Response to Intervention:   The patient's response to intervention is motivated.     Progress Toward Goals and Other Mental Status Changes:   The patient's progress toward goals is fair.     Vitals: Most recent vital signs were noted:      Vitals - 1 value per visit 5/15/2017 8/14/2017 11/15/2017 2/7/2018   SYSTOLIC 133 130 153 159   DIASTOLIC 79 83 90 86   PULSE 96 61 85 96   Weight (lb) 179 180.2 172.4 170.4   Weight (kg) 81.194 81.738 78.2 77.293   HEIGHT 5' 4" 5' 4" 5' 4" 5' 4"   BODY MASS INDEX 30.73 30.93 29.59 29.25   VISIT REPORT           Mental Status Evaluation      Appearance:  unremarkable, age appropriate, casually dressed, neatly groomed, smiles on greeting    Behavior:  cooperative, eye contact normal, pleasant, engaging    Speech:  normal tone, normal pitch, normal volume, minimally pressured    Mood:  "OK"   Affect:  Euthymic, congruent and appropriate    Thought Process:  Mildly circumstantial, logical    Thought Content:  no suicidality, no homicidality, delusions, or paranoia    Sensorium:  grossly intact    Attention Span & Concentration  intact  " "  Cognition:  fund of knowledge intact and appropriate to age and level of education    Insight:  good    Judgment:  good      Impression:   Attention-Deficit Hyperactivity Disorder, mainly inattentive type     Major Depressive Disorder, Recurrent, Mild  Anxiety Disorder, unspecified    Plan:   Medication Management -- Continue current medications as noted above. Three Rx for Adderall XR, each for a 30 day supply with no refills & with "Do not fill until" dates posted accordingly, were given to patient.      Additional Notes -- N/A    Return to Clinic: 3 months, or sooner prn.      "

## 2018-05-16 ENCOUNTER — OFFICE VISIT (OUTPATIENT)
Dept: PSYCHIATRY | Facility: CLINIC | Age: 53
End: 2018-05-16
Payer: COMMERCIAL

## 2018-05-16 VITALS
SYSTOLIC BLOOD PRESSURE: 160 MMHG | HEIGHT: 64 IN | DIASTOLIC BLOOD PRESSURE: 87 MMHG | BODY MASS INDEX: 29.13 KG/M2 | HEART RATE: 95 BPM | WEIGHT: 170.63 LBS

## 2018-05-16 DIAGNOSIS — F90.2 ATTENTION DEFICIT HYPERACTIVITY DISORDER (ADHD), COMBINED TYPE, MILD: ICD-10-CM

## 2018-05-16 DIAGNOSIS — F33.0 MDD (MAJOR DEPRESSIVE DISORDER), RECURRENT EPISODE, MILD: ICD-10-CM

## 2018-05-16 DIAGNOSIS — F41.9 ANXIETY DISORDER, UNSPECIFIED TYPE: ICD-10-CM

## 2018-05-16 DIAGNOSIS — F90.0 ATTENTION DEFICIT HYPERACTIVITY DISORDER (ADHD), PREDOMINANTLY INATTENTIVE TYPE: Primary | ICD-10-CM

## 2018-05-16 PROCEDURE — 99999 PR PBB SHADOW E&M-EST. PATIENT-LVL III: CPT | Mod: PBBFAC,,, | Performed by: PSYCHIATRY & NEUROLOGY

## 2018-05-16 PROCEDURE — 99214 OFFICE O/P EST MOD 30 MIN: CPT | Mod: S$GLB,,, | Performed by: PSYCHIATRY & NEUROLOGY

## 2018-05-16 RX ORDER — DEXTROAMPHETAMINE SACCHARATE, AMPHETAMINE ASPARTATE MONOHYDRATE, DEXTROAMPHETAMINE SULFATE AND AMPHETAMINE SULFATE 7.5; 7.5; 7.5; 7.5 MG/1; MG/1; MG/1; MG/1
60 CAPSULE, EXTENDED RELEASE ORAL EVERY MORNING
Qty: 60 CAPSULE | Refills: 0 | Status: SHIPPED | OUTPATIENT
Start: 2018-05-16 | End: 2018-08-15 | Stop reason: SDUPTHER

## 2018-05-16 RX ORDER — DEXTROAMPHETAMINE SACCHARATE, AMPHETAMINE ASPARTATE MONOHYDRATE, DEXTROAMPHETAMINE SULFATE AND AMPHETAMINE SULFATE 7.5; 7.5; 7.5; 7.5 MG/1; MG/1; MG/1; MG/1
60 CAPSULE, EXTENDED RELEASE ORAL EVERY MORNING
Qty: 60 CAPSULE | Refills: 0 | Status: SHIPPED | OUTPATIENT
Start: 2018-06-15 | End: 2018-08-15 | Stop reason: SDUPTHER

## 2018-05-16 RX ORDER — DEXTROAMPHETAMINE SACCHARATE, AMPHETAMINE ASPARTATE MONOHYDRATE, DEXTROAMPHETAMINE SULFATE AND AMPHETAMINE SULFATE 7.5; 7.5; 7.5; 7.5 MG/1; MG/1; MG/1; MG/1
60 CAPSULE, EXTENDED RELEASE ORAL EVERY MORNING
Qty: 60 CAPSULE | Refills: 0 | Status: SHIPPED | OUTPATIENT
Start: 2018-07-14 | End: 2018-08-15 | Stop reason: SDUPTHER

## 2018-05-16 NOTE — PROGRESS NOTES
"Outpatient Psychiatry Follow-Up Visit (MD/NP)   5/16/2018    Clinical Status of Patient: Outpatient (Ambulatory)     Session Length:  30 minutes (E&M)      Chief Complaint: Leah Sung is a 53 y.o. female who presents today for follow-up of depression, anxiety, inattention and interpersonal relational problems.  Met with patient.     Interval History and Content of Current Session:   General impression: First appointment since 2/7/2018. She notes interim events -- see below.     Target symptoms: Inattentional/concentrational difficulties, h/o dysthymic mood and irritibility, insomnia, crying spells, excessive worry, decreased energy and motivation, low self-esteem, feelings of helplessness; denies hopelessness or S.I. (only remote as a teen).     Interim events:  Discussed ongoing events -- self disclosure noted.      She is very hoarse in session.  She has been under a lot of stress.    She has been hoarse since Monday (past 2 days).    "It's time to leave [employer] again."  She thinks a big reason why her voice is gone is stress at work.    She notes problems with her boss.  "She gives me e-mails, plans for the next day (at night)".  She must do special projects at home.      "She judges people on what she hears, not what she sees".  Pt states she gave her a mediocre evaluation "because I don't wow her".    She talks a lot at work as part of her job.  She has been only doing clinical work, not interfacing with customers, because of the hoarseness.    She has also had to do a lot of travelling with her job.          She denies smoking.     She realizes when she does not take her medication, she does not sleep.      Her daughter is attending [employer] (freshman).  She is at home with pt, but commutes to school daily.  She has been doing very well in school (straight-A's both semesters!).     Pt is still working towards getting her Master's --  Less that 2 years to go.  The school is based in [place].  She states " this program is accredited.  She states she is taking an equivalent of 12 hours currently (graduate level courses).  The Masters program is for [employer] with [employer] -- she states she will have a degree in this discipline.  She must watch a lot of on line videos every week.         She thinks her meds are helping her to manage her ADHD, depression and anxiety Sx well.    She continues to take the Adderall XR 30 mg in AM.  Without the medication, she is more hyperverbal, more spontaneous, speaks more rapidly, tends to change subjects more.  She states she is more calm when she takes 2 & tends to be much more quiet and reserved.  She still gets distracted easily if she does not take both tabs daily.  She will tend to cut back some on days when she is not working.      Pt states she has modified her diet; she is working on other lifestyle modifications.    We have discussed mindful meditation as well (pt has been trying a form of this on her own).      PCP:  None currently.      Labs: Not done since 9/13/12: all results for TSH, CBC and CMP were unremarkable.     Review of Systems   · PSYCHIATRIC: Pertinant items are noted in the narrative.  · CONSTITUTIONAL: + recent wt loss     · MUSCULOSKELETAL: No pain or stiffness of the joints.  · NEUROLOGIC: No weakness, sensory changes, seizures, confusion, memory loss, tremor or other abnormal movements.  · ENDOCRINE: No polydipsia or polyuria.  · INTEGUMENTARY: No rashes or lacerations.  · EYES: No exophthalmos, jaundice or blindness.  · ENT: No dizziness, tinnitus or hearing loss.  · RESPIRATORY: No shortness of breath.  · CARDIOVASCULAR: No tachycardia or chest pain.  · GASTROINTESTINAL: No nausea, vomiting, pain, constipation or diarrhea.  · GENITOURINARY: No frequency, dysuria or sexual dysfunction.      Current Psychotropic Medications:   Adderall XR 30 mg   Two tablets oral in AM daily (GENERALLY TAKES BID DAILY)    Adderall 20 mg  1 tablet in PM prn in  "attention (TAKES RARELY)   Wellbutrin  mg   1 tablet oral daily (TAKES DAILY)     Compliance: Yes     Side effects: None     Risk Parameters:   Patient reports no suicidal ideation   Patient reports no homicidal ideation   Patient reports no self-injurious behavior   Patient reports no violent behavior     Patient's Response to Intervention:   The patient's response to intervention is motivated.     Progress Toward Goals and Other Mental Status Changes:   The patient's progress toward goals is fair.     Vitals: Most recent vital signs were noted:      Vitals - 1 value per visit 8/14/2017 11/15/2017 2/7/2018 5/16/2018   SYSTOLIC 130 153 159 160   DIASTOLIC 83 90 86 87   PULSE 61 85 96 95   Weight (lb) 180.2 172.4 170.4 170.64   Weight (kg) 81.738 78.2 77.293 77.4   HEIGHT 5' 4" 5' 4" 5' 4" 5' 4"   BODY MASS INDEX 30.93 29.59 29.25 29.29   VISIT REPORT           Mental Status Evaluation      Appearance:  unremarkable, age appropriate, casually dressed, neatly groomed, smiles on greeting    Behavior:  cooperative, eye contact normal, pleasant, engaging    Speech:  normal tone, normal pitch, normal volume, minimally pressured    Mood:  "OK"   Affect:  Euthymic, congruent and appropriate    Thought Process:  Mildly circumstantial, logical    Thought Content:  no suicidality, no homicidality, delusions, or paranoia    Sensorium:  grossly intact    Attention Span & Concentration  intact    Cognition:  fund of knowledge intact and appropriate to age and level of education    Insight:  good    Judgment:  good      Impression:   Attention-Deficit Hyperactivity Disorder, mainly inattentive type     Major Depressive Disorder, Recurrent, Mild  Anxiety Disorder, unspecified    Plan:   Medication Management -- Continue current medications as noted above. Three Rx for Adderall XR, each for a 30 day supply with no refills & with "Do not fill until" dates posted accordingly, were given to patient.      Additional Notes -- " N/A    Return to Clinic: 3 months, or sooner prn.

## 2018-08-15 ENCOUNTER — OFFICE VISIT (OUTPATIENT)
Dept: PSYCHIATRY | Facility: CLINIC | Age: 53
End: 2018-08-15
Payer: COMMERCIAL

## 2018-08-15 VITALS
BODY MASS INDEX: 28.64 KG/M2 | SYSTOLIC BLOOD PRESSURE: 156 MMHG | HEART RATE: 83 BPM | HEIGHT: 64 IN | WEIGHT: 167.75 LBS | DIASTOLIC BLOOD PRESSURE: 86 MMHG

## 2018-08-15 DIAGNOSIS — Z56.9 OCCUPATIONAL PROBLEM: ICD-10-CM

## 2018-08-15 DIAGNOSIS — F90.2 ATTENTION DEFICIT HYPERACTIVITY DISORDER (ADHD), COMBINED TYPE, MILD: ICD-10-CM

## 2018-08-15 DIAGNOSIS — F33.0 MDD (MAJOR DEPRESSIVE DISORDER), RECURRENT EPISODE, MILD: ICD-10-CM

## 2018-08-15 DIAGNOSIS — F90.0 ATTENTION DEFICIT HYPERACTIVITY DISORDER (ADHD), PREDOMINANTLY INATTENTIVE TYPE: Primary | ICD-10-CM

## 2018-08-15 DIAGNOSIS — F41.9 ANXIETY DISORDER, UNSPECIFIED TYPE: ICD-10-CM

## 2018-08-15 PROCEDURE — 99999 PR PBB SHADOW E&M-EST. PATIENT-LVL III: CPT | Mod: PBBFAC,,, | Performed by: PSYCHIATRY & NEUROLOGY

## 2018-08-15 PROCEDURE — 99214 OFFICE O/P EST MOD 30 MIN: CPT | Mod: S$GLB,,, | Performed by: PSYCHIATRY & NEUROLOGY

## 2018-08-15 RX ORDER — DEXTROAMPHETAMINE SACCHARATE, AMPHETAMINE ASPARTATE MONOHYDRATE, DEXTROAMPHETAMINE SULFATE AND AMPHETAMINE SULFATE 7.5; 7.5; 7.5; 7.5 MG/1; MG/1; MG/1; MG/1
60 CAPSULE, EXTENDED RELEASE ORAL EVERY MORNING
Qty: 60 CAPSULE | Refills: 0 | Status: SHIPPED | OUTPATIENT
Start: 2018-08-15

## 2018-08-15 RX ORDER — DEXTROAMPHETAMINE SACCHARATE, AMPHETAMINE ASPARTATE MONOHYDRATE, DEXTROAMPHETAMINE SULFATE AND AMPHETAMINE SULFATE 7.5; 7.5; 7.5; 7.5 MG/1; MG/1; MG/1; MG/1
60 CAPSULE, EXTENDED RELEASE ORAL EVERY MORNING
Qty: 60 CAPSULE | Refills: 0 | Status: SHIPPED | OUTPATIENT
Start: 2018-09-14

## 2018-08-15 RX ORDER — DEXTROAMPHETAMINE SACCHARATE, AMPHETAMINE ASPARTATE MONOHYDRATE, DEXTROAMPHETAMINE SULFATE AND AMPHETAMINE SULFATE 7.5; 7.5; 7.5; 7.5 MG/1; MG/1; MG/1; MG/1
60 CAPSULE, EXTENDED RELEASE ORAL EVERY MORNING
Qty: 60 CAPSULE | Refills: 0 | Status: SHIPPED | OUTPATIENT
Start: 2018-10-14

## 2018-08-15 SDOH — SOCIAL DETERMINANTS OF HEALTH (SDOH): UNSPECIFIED PROBLEMS RELATED TO EMPLOYMENT: Z56.9

## 2018-08-15 NOTE — PROGRESS NOTES
"WILL NEED LABS AND EKG ON RTC.    Outpatient Psychiatry Follow-Up Visit (MD/NP)   8/15/2018    Clinical Status of Patient: Outpatient (Ambulatory)     Session Length:  30 minutes (E&M)      Chief Complaint: Leah Sung is a 53 y.o. female who presents today for follow-up of depression, anxiety, inattention and interpersonal relational problems.  Met with patient.     Interval History and Content of Current Session:   General impression: First appointment since 5/16/2018. She notes interim events -- see below.     Target symptoms: Inattentional/concentrational difficulties, h/o dysthymic mood and irritibility, insomnia, crying spells, excessive worry, decreased energy and motivation, low self-esteem, feelings of helplessness; denies hopelessness or S.I. (only remote as a teen).     Interim events:  Discussed ongoing events -- self disclosure noted.      "I'm coping".    She states she is working on leaving [employer] again.   She has a boss who is expecting her to do too much with the allotted time she has available for her job.  She still must do special projects at home.  "I travel a lot for my job".    She has been written up multiple times for things, such as less than optimal productivity.  Her boss also does not show good leadership.    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    She states she is planning to meet with her boss' supervisor soon to discuss all of her concerns.    Pt thinks her boss is a narcissist who harangues the employees, including pt, who work under her.    She would like to leave her job on her own terms, not to be fired or have terms dictated to her.   She asks if I would be willing to complete forms for her to be able to take off some time from work to complete her Masters work, take (and pass) the exam -- this will put her in a better position " to leave [employer] for good and chart her own course.    She is currently completing the 3rd year of her Master's degree.  The school is based in [place].  She states this program is accredited.  She states she is taking an equivalent of 12 hours currently (graduate level courses).  The Masters program is for [employer] with [employer] -- she states she will have a degree in this discipline.  She must watch a lot of on line videos every week.         She thinks her meds are helping her to manage her ADHD, depression and anxiety Sx well.    She continues to take the Adderall XR 30 mg in AM.  Without the medication, she is more hyperverbal, more spontaneous, speaks more rapidly, tends to change subjects more.  She states she is more calm when she takes 2 & tends to be much more quiet and reserved.  She still gets distracted easily if she does not take both tabs daily.  She will tend to cut back some on days when she is not working.        She has denied smoking.   Pt states she has modified her diet; she is working on other lifestyle modifications.    She also tries to exercise and practice some mindful meditation daily.      She realizes when she does not take her medication, she does not sleep.      Her daughter is attending [employer] (freshman).  She is at home with pt, but commutes to school daily.  She has been doing very well in school so far.        PCP:  None currently.      Labs: Not done at [facility] since 9/13/12: all results for TSH, CBC and CMP were unremarkable.     Review of Systems   · PSYCHIATRIC: Pertinant items are noted in the narrative.  · CONSTITUTIONAL: + recent wt loss     · MUSCULOSKELETAL: No pain or stiffness of the joints.  · NEUROLOGIC: No weakness, sensory changes, seizures, confusion, memory loss, tremor or other abnormal movements.  · ENDOCRINE: No polydipsia or polyuria.  · INTEGUMENTARY: No rashes or lacerations.  · EYES: No exophthalmos, jaundice or blindness.  · ENT: No dizziness,  "tinnitus or hearing loss.  · RESPIRATORY: No shortness of breath.  · CARDIOVASCULAR: No tachycardia or chest pain.  · GASTROINTESTINAL: No nausea, vomiting, pain, constipation or diarrhea.  · GENITOURINARY: No frequency, dysuria or sexual dysfunction.      Current Psychotropic Medications:   Adderall XR 30 mg   Two tablets oral in AM daily (GENERALLY TAKES BID DAILY)    Adderall 20 mg  1 tablet in PM prn in attention (TAKES RARELY)   Wellbutrin  mg   1 tablet oral daily (TAKES DAILY)     Compliance: Yes     Side effects: None     Risk Parameters:   Patient reports no suicidal ideation   Patient reports no homicidal ideation   Patient reports no self-injurious behavior   Patient reports no violent behavior     Patient's Response to Intervention:   The patient's response to intervention is motivated.     Progress Toward Goals and Other Mental Status Changes:   The patient's progress toward goals is fair.     Vitals: Most recent vital signs were noted:      Vitals - 1 value per visit 11/15/2017 2/7/2018 5/16/2018 8/15/2018   SYSTOLIC 153 159 160 156   DIASTOLIC 90 86 87 86   PULSE 85 96 95 83   Weight (lb) 172.4 170.4 170.64 167.77   Weight (kg) 78.2 77.293 77.4 76.1   HEIGHT 5' 4" 5' 4" 5' 4" 5' 4"   BODY MASS INDEX 29.59 29.25 29.29 28.8   VISIT REPORT           Mental Status Evaluation      Appearance:  unremarkable, age appropriate, casually dressed, neatly groomed, smiles on greeting    Behavior:  cooperative, eye contact normal, pleasant, engaging    Speech:  normal tone, normal pitch, normal volume, minimally pressured    Mood:  "Stressed"   Affect:  Euthymic to mildly anxious, congruent and appropriate    Thought Process:  Mildly circumstantial, logical    Thought Content:  no suicidality, no homicidality, delusions, or paranoia    Sensorium:  grossly intact    Attention Span & Concentration  intact    Cognition:  fund of knowledge intact and appropriate to age and level of education    Insight:  good  " "  Judgment:  good      Impression:   Attention-Deficit Hyperactivity Disorder, mainly inattentive type     Major Depressive Disorder, Recurrent, Mild  Anxiety Disorder, unspecified    Plan:   Medication Management -- Continue current medications as noted above. Three Rx for Adderall XR, each for a 30 day supply with no refills & with "Do not fill until" dates posted accordingly, were given to patient.      Additional Notes -- No lab results on file since 2012.  No EKG is on file since at least 2012.  Will need to have EKG and labs done on RTC, or at least have recent test results sent to be scanned into pt's EMR.    Return to Clinic: 3 months, or sooner prn.      "

## 2018-10-05 ENCOUNTER — OFFICE VISIT (OUTPATIENT)
Dept: OPTOMETRY | Facility: CLINIC | Age: 53
End: 2018-10-05
Payer: COMMERCIAL

## 2018-10-05 DIAGNOSIS — H52.13 MYOPIA OF BOTH EYES WITH ASTIGMATISM AND PRESBYOPIA: ICD-10-CM

## 2018-10-05 DIAGNOSIS — H04.129 DRY EYE SYNDROME, UNSPECIFIED LATERALITY: Primary | ICD-10-CM

## 2018-10-05 DIAGNOSIS — H52.4 MYOPIA OF BOTH EYES WITH ASTIGMATISM AND PRESBYOPIA: ICD-10-CM

## 2018-10-05 DIAGNOSIS — Z46.0 FITTING AND ADJUSTMENT OF SPECTACLES AND CONTACT LENSES: Primary | ICD-10-CM

## 2018-10-05 DIAGNOSIS — H52.203 MYOPIA OF BOTH EYES WITH ASTIGMATISM AND PRESBYOPIA: ICD-10-CM

## 2018-10-05 PROCEDURE — 92015 DETERMINE REFRACTIVE STATE: CPT | Mod: S$GLB,,, | Performed by: OPTOMETRIST

## 2018-10-05 PROCEDURE — 92310 CONTACT LENS FITTING OU: CPT | Mod: ,,, | Performed by: OPTOMETRIST

## 2018-10-05 PROCEDURE — 99999 PR PBB SHADOW E&M-EST. PATIENT-LVL III: CPT | Mod: PBBFAC,,, | Performed by: OPTOMETRIST

## 2018-10-05 PROCEDURE — 92014 COMPRE OPH EXAM EST PT 1/>: CPT | Mod: S$GLB,,, | Performed by: OPTOMETRIST

## 2018-10-05 NOTE — PATIENT INSTRUCTIONS
What Are Dry Eyes?    Do your eyes ever sting, burn, or feel scratchy? To be comfortable, your eyes need to be bathed, or lubricated, with tears. Normally, there is always a film of tears on the surface of your eyes. But if your eyes dont produce enough tears, the surface gets irritated. This is known as dry eyes.  Not enough lubricating tears  When you cry, or get something in your eye, or have an infection, your eyes make reflex tears. Each time you blink, another kind of tears, called lubricating tears, spread over the surface of your eyes. These tears keep the eyes moist and comfortable. You arent aware of these tears because they stay on the surface of your eyes.  Without lubricating tears, your eyes get dry. Then they burn or sting and feel scratchy. They may also water. But this doesnt relieve the dryness. That's because the eyes water with reflex tears, not lubricating tears.  What causes dry eyes?  · Aging  · Heaters and air conditioners  · Wind, smoke, or dry weather  · Allergies such as hay fever  · Medicines  · Eyelid problems, injuries to the eye, or diseases like rheumatoid arthritis  How lubricating tears flow  Lubricating tears flow from glands in your upper eyelid over the surface of your eye. From your eye, the tears drain into puncta, which connect to drainage canals that lead to your nose.  Date Last Reviewed: 6/6/2015  © 6933-8456 [employer] [address] [place] [address]. All rights reserved. This information is not intended as a substitute for professional medical care. Always follow your healthcare professional's instructions.        Treating Dry Eyes    Artificial tears are the most common treatment for dry eyes. If they dont relieve your symptoms, your eye doctor may put in plugs. Or you may have surgery to stop the draining and increase the tear film.  Artificial tears  Artificial tears, or lubricating eye drops, replace your natural lubricating tears. You  can buy most lubricating eye drops without a prescription. And you can use them as often as needed. Lubricating eye drops are not the same as eye drops used to relieve redness or itching. Check with your eye doctor or pharmacist to be sure you buy the right drops.  Some lubricating eye drops have chemicals called preservatives. This makes them last longer. Your eyes may be sensitive to these drops. Or you may need to use them often. If so, you may want to buy lubricating eye drops made without preservatives. Your eye doctor may also suggest using a lubricating eye ointment at night.  Medicine  Your doctor may prescribe medicine such as cyclosporine to treat your eye condition. It can help increase your eyes' ability to make tears.  Plugs  Closing the puncta with plugs can help keep the tear film on your eye. The plug acts like a stopper in a sink. It allows only a small amount of tears to drain out of your eye. Your eye doctor may first try short-term (temporary) plugs that dissolve in a few days. If these help, he or she may then put in long-term plugs. Your eyes will be numbed with drops when the plugs are inserted. You shouldnt feel any pain. And you shouldnt feel the plugs once theyre in.   Surgery  If artificial tears or plugs dont relieve your dry eyes, surgery may be an option. Your eye doctor may do minor outpatient surgery to narrow or block the openings to the drainage canals. If your dry eyes are caused by eyelid problems, your eye doctor may recommend other kinds of surgery.  Date Last Reviewed: 6/6/2015 © 2000-2017 [employer] [address] [place] [address], [place] [address]. All rights reserved. This information is not intended as a substitute for professional medical care. Always follow your healthcare professional's instructions.

## 2018-10-05 NOTE — PROGRESS NOTES
HPI     Concerns About Ocular Health      Additional comments: having some problems with glare later in the day.               Comments     DLS 2/2/16 Dr Manley  Here for new glasses and contact lens fit. Uses biofinity 8.6 14.0 +1.25   os only monovision.   No flashes or floater   No itching burning or tearing  Dry eyes            Last edited by Jyoti Martinez on 10/5/2018  9:41 AM. (History)        ROS     Negative for: Constitutional, Gastrointestinal, Neurological, Skin,   Genitourinary, Musculoskeletal, HENT, Endocrine, Cardiovascular, Eyes,   Respiratory, Psychiatric, Allergic/Imm, Heme/Lymph    Last edited by Julio Bolanos, OD on 10/5/2018 10:19 AM. (History)        Assessment /Plan     For exam results, see Encounter Report.    Dry eye syndrome, unspecified laterality    Myopia of both eyes with astigmatism and presbyopia      SRx and CLRx updated. Increased add power to assist in reading. Rec Systane Ultra or Optive BId-tID Ou. RTC 1 year.   Pt states she is moving to [place].

## 2018-11-13 ENCOUNTER — TELEPHONE (OUTPATIENT)
Dept: OPHTHALMOLOGY | Facility: CLINIC | Age: 53
End: 2018-11-13

## 2018-11-13 NOTE — TELEPHONE ENCOUNTER
Returned pt call to get fax number to fax Rx , but pt doesn't have fax number available.  Advised pt that her Rx is available on [facility].  Pt understood and will call back with any questions.

## 2018-11-13 NOTE — TELEPHONE ENCOUNTER
----- Message from Julio Bolanos OD sent at 11/13/2018  1:11 PM CST -----  Contact: Leah  Please call the patient to get fax number. Also let her know that her Rx is available on [facility].  ----- Message -----  From: Chen Carter  Sent: 11/12/2018   4:45 PM  To: Julio Bolanos OD        ----- Message -----  From: Mechelle Boyce  Sent: 11/9/2018   4:35 PM  To: Luis [facility] Kim Staff    Pt wanted to know if she could get her prescription fax over to her. Pt states that she have move out of [place] and not sure if her prescription can be fax. Pt can be reached at [phone].

## 2019-02-19 ENCOUNTER — PATIENT MESSAGE (OUTPATIENT)
Dept: OPTOMETRY | Facility: CLINIC | Age: 54
End: 2019-02-19